# Patient Record
Sex: FEMALE | Race: WHITE | URBAN - METROPOLITAN AREA
[De-identification: names, ages, dates, MRNs, and addresses within clinical notes are randomized per-mention and may not be internally consistent; named-entity substitution may affect disease eponyms.]

---

## 2017-07-14 ENCOUNTER — IMPORTED ENCOUNTER (OUTPATIENT)
Dept: URBAN - METROPOLITAN AREA CLINIC 31 | Facility: CLINIC | Age: 67
End: 2017-07-14

## 2017-07-14 PROBLEM — H25.13: Noted: 2017-07-14

## 2017-07-14 PROBLEM — H18.59: Noted: 2017-07-14

## 2017-07-14 PROBLEM — H35.3111: Noted: 2017-07-14

## 2017-07-14 PROCEDURE — 92014 COMPRE OPH EXAM EST PT 1/>: CPT

## 2017-07-14 PROCEDURE — 92015 DETERMINE REFRACTIVE STATE: CPT

## 2022-04-02 ASSESSMENT — KERATOMETRY
OD_AXISANGLE2_DEGREES: 115
OS_AXISANGLE_DEGREES: 005
OD_K1POWER_DIOPTERS: 45.00
OD_K2POWER_DIOPTERS: 44.00
OD_AXISANGLE_DEGREES: 025
OS_AXISANGLE2_DEGREES: 095
OS_K1POWER_DIOPTERS: 43.75
OS_K2POWER_DIOPTERS: 44.25

## 2022-04-02 ASSESSMENT — VISUAL ACUITY
OS_CC: 20/30-1
OD_SC: J314''
OU_CC: 20/25

## 2022-04-02 ASSESSMENT — TONOMETRY
OS_IOP_MMHG: 16
OD_IOP_MMHG: 15

## 2022-06-01 NOTE — PATIENT DISCUSSION
The patient was informed that with the Basic option, they will most likely need prescription glasses at all focal points after surgery. The patient elects Basic OS, goal of -2.50 D. *WANTS TO STAY NEAR-SIGHTED*.

## 2022-07-30 ENCOUNTER — TELEPHONE ENCOUNTER (OUTPATIENT)
Age: 72
End: 2022-07-30

## 2022-07-30 RX ORDER — HYOSCYAMINE SULFATE 0.12 MG/1
1-2 TABLET DISPERSE TABLET, CHEWABLE ORAL
Qty: 0 | Refills: 16 | OUTPATIENT
Start: 2013-12-16 | End: 2015-07-08

## 2022-07-30 RX ORDER — ESOMEPRAZOLE MAGNESIUM 40 MG
1 (ONE) CAPSULE,DELAYED RELEASE (ENTERIC COATED) ORAL DAILY
Qty: 0 | Refills: 2 | OUTPATIENT
Start: 2015-07-08 | End: 2015-07-28

## 2022-07-31 ENCOUNTER — TELEPHONE ENCOUNTER (OUTPATIENT)
Age: 72
End: 2022-07-31

## 2022-07-31 RX ORDER — OMEPRAZOLE 40 MG/1
1 (ONE) CAPSULE, DELAYED RELEASE ORAL DAILY
Qty: 0 | Refills: 2 | Status: ACTIVE | COMMUNITY
Start: 2015-08-08

## 2022-07-31 RX ORDER — HYOSCYAMINE SULFATE 0.12 MG/1
1-2 TABLET, CHEWABLE ORAL
Qty: 0 | Refills: 16 | Status: ACTIVE | COMMUNITY
Start: 2013-12-16

## 2022-07-31 RX ORDER — OMEPRAZOLE 40 MG/1
1 (ONE) CAPSULE, DELAYED RELEASE ORAL DAILY
Qty: 0 | Refills: 16 | Status: ACTIVE | COMMUNITY
Start: 2015-07-08

## 2022-07-31 RX ORDER — ESOMEPRAZOLE MAGNESIUM 40 MG
1 (ONE) CAPSULE,DELAYED RELEASE (ENTERIC COATED) ORAL DAILY
Qty: 0 | Refills: 2 | Status: ACTIVE | COMMUNITY
Start: 2015-07-08

## 2022-07-31 RX ORDER — OMEPRAZOLE 40 MG/1
1 (ONE) CAPSULE, DELAYED RELEASE ORAL DAILY
Qty: 0 | Refills: 16 | Status: ACTIVE | COMMUNITY
Start: 2015-08-07